# Patient Record
Sex: MALE | Race: ASIAN | Employment: FULL TIME | URBAN - METROPOLITAN AREA
[De-identification: names, ages, dates, MRNs, and addresses within clinical notes are randomized per-mention and may not be internally consistent; named-entity substitution may affect disease eponyms.]

---

## 2017-02-14 ENCOUNTER — OFFICE VISIT (OUTPATIENT)
Dept: HEMATOLOGY | Age: 47
End: 2017-02-14

## 2017-02-14 VITALS
RESPIRATION RATE: 19 BRPM | DIASTOLIC BLOOD PRESSURE: 78 MMHG | HEIGHT: 67 IN | SYSTOLIC BLOOD PRESSURE: 103 MMHG | HEART RATE: 74 BPM | TEMPERATURE: 98.5 F | WEIGHT: 160.8 LBS | BODY MASS INDEX: 25.24 KG/M2 | OXYGEN SATURATION: 99 %

## 2017-02-14 DIAGNOSIS — B18.1 CHRONIC HEPATITIS B (HCC): Primary | ICD-10-CM

## 2017-02-14 NOTE — MR AVS SNAPSHOT
Visit Information Date & Time Provider Department Dept. Phone Encounter #  
 2/14/2017  8:30 AM Jennifer Freedman NP [facility]  [facility] [id number] Follow-up Instructions Return in about 6 months (around 8/14/2017). Your Appointments 8/14/2017  8:30 AM  
Follow Up with Jennifer Freed[facility] ([address] Appt Note: Follow Up  
 [address] [place] [address] At [facility] [phone] [facility] [address]  
59 [facility] [address] Upcoming Health Maintenance Date Due Pneumococcal 19-64 Medium Risk (1 of 1 - PPSV23) 5/3/1990 DTaP/Tdap/Td series (1 - Tdap) 5/3/1992 INFLUENZA AGE 9 TO ADULT 8/1/2016 Allergies as of 2/14/2017  Review Complete On: 8/15/2016 By: Jennifer Freedman NP Severity Noted Reaction Type Reactions Penicillins  10/10/2011    Unknown (comments) Pt had a reaction as a child and is unsure if he is still allergic. Current Immunizations  Never Reviewed No immunizations on file. Not reviewed this visit You Were Diagnosed With   
  
 Codes Comments Chronic hepatitis B ([facility]    -  Primary ICD-10-CM: B18.1 ICD-9-CM: 070.32 Vitals BP Pulse Temp Resp Height(growth percentile) 103/78 (BP 1 Location: Left arm, BP Patient Position: Sitting) 74 98.5 °F (36.9 °C) (Tympanic) 19 5' 7\" (1.702 m) Weight(growth percentile) SpO2 BMI Smoking Status 160 lb 12.8 oz (72.9 kg) 99% 25.18 kg/m2 Never Smoker BMI and BSA Data Body Mass Index Body Surface Area  
 25.18 kg/m 2 1.86 m 2 Preferred Pharmacy Pharmacy Name Phone [id number] [place], [address] [address] [phone] Your Updated Medication List  
  
Notice  As of 2/14/2017  9:05 AM  
 You have not been prescribed any medications. We Performed the Following HEPATIC FUNCTION PANEL [95837 CPT(R)] HEPATITIS B, QT, PCR [88230 CPT(R)] Follow-up Instructions Return in about 6 months (around 8/14/2017). To-Do List   
 07/14/2017 Imaging:  [facility] & HEALTH SERVICES! Dear Don Stanford: Thank you for requesting a Hyper9 account. Our records indicate that you already have an active Hyper9 account. You can access your account anytime at [email or web address]. [email or web address] Did you know that you can access your hospital and ER discharge instructions at any time in Hyper9? You can also review all of your test results from your hospital stay or ER visit. Additional Information If you have questions, please visit the Frequently Asked Questions section of the Hyper9 website at [email or web address] Remember, Hyper9 is NOT to be used for urgent needs. For medical emergencies, dial 911. Now available from your iPhone and Android! Please provide this summary of care documentation to your next provider. Your primary care clinician is listed as Mary Alvarez. If you have any questions after today's visit, please call [phone].

## 2017-02-14 NOTE — PROGRESS NOTES
93 Nandini Arrington MD, [address]     April Sergio Sheth, KIN Ruth MD, MD Kizzy Armendariz Rivka Levant, SOPHIA Chenaritan     [address], [address] [facility], [address]     [phone]     FAX: [address] [place] [address], [address] [facility], [address]     [phone]     FAX: [phone]     PCP: Erlin Neal MD  GI: Cassie Meier    Patient Active Problem List   Diagnosis Code    Chronic hepatitis B ([facility].) B18.1       Remy Richmond returns to the [address] regarding chronic HBV and its management. The active problem list, all pertinent past medical history, medications, radiologic findings and laboratory findings related to the liver disorder were reviewed with the patient. The patient is a 40y.o. year old , male who has tested positive for HBV as a teenager while living in [place] in the 1970s. His HBV has remained inactive and hasn't required antiviral therapy. The most recent laboratory studies indicate that his liver transaminases, alkaline phosphatase, tests of hepatic synthetic and metabolic function and the platelet count are all normal. His last liver ultrasound was normal.    The patient feels well and voices no complaints today. He continues to work full time. The patient completes all daily activities without any functional limitations. The patient has not noted yellowing of the eyes or skin, itching, dark urine, swelling of the lower extremities, hematemesis, hematochezia. Allergies   Allergen Reactions    Penicillins Unknown (comments)     Pt had a reaction as a child and is unsure if he is still allergic. No current outpatient prescriptions on file. No current facility-administered medications for this visit.       REVIEW OF SYSTEMS:  Systems related to all organ systems were reviewed. All were negative except as noted above. FAMILY/SOCIAL HISTORY:  The patient is . There is 1 child. The patient has never smoked tobacco products. The patient has never consumed significant amounts of alcohol. The patient currently works full time in banking/finance. PHYSICAL EXAMINATION:  Visit Vitals    /78 (BP 1 Location: Left arm, BP Patient Position: Sitting)    Pulse 74    Temp 98.5 °F (36.9 °C) (Tympanic)    Resp 19    Ht 5' 7\" (1.702 m)    Wt 160 lb 12.8 oz (72.9 kg)    SpO2 99%    BMI 25.18 kg/m2     General: No acute distress. Eyes: Sclera anicteric. ENT: No oral lesions, thyroid normal.  Nodes: No adenopathy. Skin: No spider angiomata, jaundice, palmar erythema. Respiratory: Lungs clear to auscultation. Cardiovascular: Regular heart rate, no murmurs, no JVD. Abdomen: Soft non-tender, liver size normal to percussion/palpation. Spleen not palpable. No obvious ascites. Extremities: No edema, no muscle wasting, no gross arthritic changes. Neurologic: Alert and oriented, cranial nerves grossly intact, no asterixsis. LABORATORY STUDIES:    Liver [place] of [address] [place] [address] & Units 2/14/2017 8/15/2016 1/21/2016   AST 0 - 40 IU/L 20 13 15   ALT 0 - 44 IU/L 27 23 16   Alk Phos 39 - 117 IU/L 37 (L) 38 (L) 37 (L)   Bili, Total 0.0 - 1.2 mg/dL 0.9 0.7 1.0   Bili, Direct 0.00 - 0.40 mg/dL 0.18 0.16    Albumin 3.5 - 5.5 g/dL 4.5 4.3 4.5   BUN 6 - 24 mg/dL   21   Creat 0.76 - 1.27 mg/dL   0.96   Na 134 - 144 mmol/L   142   K 3.5 - 5.2 mmol/L   4.0   Cl 97 - 108 mmol/L   102   CO2 18 - 29 mmol/L   25   Glucose 65 - 99 mg/dL   100 (H)   Virology Latest Ref Rng & Units 2/14/2017 8/15/2016 1/21/2016   HBV DNA IU/mL 1040 390 930     SEROLOGIES:  9/2011: HBsurface antigen positive, HBE antgen negative, anti-HBE positive.   10/2011: HAV total negative, HBsAntigen positive, anti-HBcore positive, anti-HBsurface negative, HBEantigen negative, anti-HBE positive, HBV DNA Log 4.6  Serologies Latest Ref Rng 10/10/2011   Hep A Ab, Total Negative Negative   Hep B Surface Ag Negative Confirm. indicated   Hep B Core Ab, Total Negative Positive (A)   Hep B Surface Ab 0.00 - 0.99 Index Value <0.1     LIVER HISTOLOGY:  Not performed    ENDOSCOPIC PROCEDURES:  Not performed    RADIOLOGY:  9/2011: Ultrasound of the liver. Echogenic consistent with fatty liver or chronic liver disease. No liver mass lesions. No ascites. 11/2012: Abdominal ultrasound-nonvisualization of the pancreas otherwise unremarkable. 12/2013. Abdominal ultrasound-normal abdominal exam.  1/2016. Ultrasound of liver. Normal liver. No liver mass lesions. No dilated bile ducts. No ascites. 7/2016. Ultrasound of liver. Echogenic consistent with chronic liver disease. No liver mass lesions. No dilated bile ducts. No ascites. OTHER TESTING:  Not available     ASSESSMENT AND PLAN:  Chronic HBV with normal liver transaminases and liver function. He remains inactive with a normal ALT and low levels of HBV DNA. He will continue follow up every 6 months to assess for activation. [facility] 75. surveillance. Currently up to date with an unremarkable ultrasound in July 2016. Next imaging will be in July 2017. This was ordered today. The patient was directed to continue all current medications at the current dosages. There are no contraindications for the patient to take any medications that are necessary for treatment of other medical issues. [facility] 87 in 6 months.     Rodrigo Moore NP  Liver [place] [address]  Ph: [phone]  Fax: [phone]  Email: [email or web address]

## 2017-02-15 LAB
ALBUMIN SERPL-MCNC: 4.5 G/DL (ref 3.5–5.5)
ALP SERPL-CCNC: 37 IU/L (ref 39–117)
ALT SERPL-CCNC: 27 IU/L (ref 0–44)
AST SERPL-CCNC: 20 IU/L (ref 0–40)
BILIRUB DIRECT SERPL-MCNC: 0.18 MG/DL (ref 0–0.4)
BILIRUB SERPL-MCNC: 0.9 MG/DL (ref 0–1.2)
HBV DNA SERPL NAA+PROBE-ACNC: 1040 IU/ML
HBV DNA SERPL NAA+PROBE-LOG IU: 3.02 LOG10IU/ML
PROT SERPL-MCNC: 6.9 G/DL (ref 6–8.5)
REF LAB TEST REF RANGE: NORMAL

## 2017-09-19 ENCOUNTER — OFFICE VISIT (OUTPATIENT)
Dept: HEMATOLOGY | Age: 47
End: 2017-09-19

## 2017-09-19 VITALS
HEART RATE: 78 BPM | SYSTOLIC BLOOD PRESSURE: 114 MMHG | DIASTOLIC BLOOD PRESSURE: 65 MMHG | BODY MASS INDEX: 24.28 KG/M2 | TEMPERATURE: 98.2 F | WEIGHT: 155 LBS | OXYGEN SATURATION: 99 %

## 2017-09-19 DIAGNOSIS — B18.1 CHRONIC HEPATITIS B (HCC): Primary | ICD-10-CM

## 2017-09-19 NOTE — PROGRESS NOTES
134 E Rebound MD Sebas, [address], [place], [place]       Climmie Esther, KIN Jeter, [facility]   SOPHIA Saha NP        at [address], [address] [address]     [phone]     FAX: [phone]    at [facility], [address] [place] [address], [address]     [phone]     FAX: [phone]     PCP: Yuriy Melo MD  GI: Jody Chappell    Patient Active Problem List   Diagnosis Code    Chronic hepatitis B ([facility] B18.1       Sis Vyas returns to the [address] regarding chronic HBV and its management. The active problem list, all pertinent past medical history, medications, radiologic findings and laboratory findings related to the liver disorder were reviewed with the patient. The patient is a 56y.o. year old , male who has tested positive for HBV as a teenager while living in [place] in the 1970s. His HBV has remained inactive and hasn't required antiviral therapy. The most recent laboratory studies indicate that his liver transaminases, alkaline phosphatase, tests of hepatic synthetic and metabolic function and the platelet count are all normal. His last liver ultrasound was normal.    The patient feels well and voices no complaints today. He continues to work full time. The patient completes all daily activities without any functional limitations. The patient has not noted yellowing of the eyes or skin, itching, dark urine, swelling of the lower extremities, hematemesis, hematochezia. Allergies   Allergen Reactions    Penicillins Unknown (comments)     Pt had a reaction as a child and is unsure if he is still allergic. No current outpatient prescriptions on file. No current facility-administered medications for this visit.       REVIEW OF SYSTEMS:  Systems related to all organ systems were reviewed. All were negative except as noted above. FAMILY/SOCIAL HISTORY:  The patient is . There is 1 child. The patient has never smoked tobacco products. The patient has never consumed significant amounts of alcohol. The patient currently works full time in banking/finance. PHYSICAL EXAMINATION:  Visit Vitals    /65    Pulse 78    Temp 98.2 °F (36.8 °C) (Tympanic)    Wt 155 lb (70.3 kg)    SpO2 99%    BMI 24.28 kg/m2     General: No acute distress. Eyes: Sclera anicteric. ENT: No oral lesions, thyroid normal.  Nodes: No adenopathy. Skin: No spider angiomata, jaundice, palmar erythema. Respiratory: Lungs clear to auscultation. Cardiovascular: Regular heart rate, no murmurs, no JVD. Abdomen: Soft non-tender, liver size normal to percussion/palpation. Spleen not palpable. No obvious ascites. Extremities: No edema, no muscle wasting, no gross arthritic changes. Neurologic: Alert and oriented, cranial nerves grossly intact, no asterixsis. LABORATORY STUDIES:    [address] & Units 9/19/2017 2/14/2017 8/15/2016   WBC 3.4 - 10.8 x10E3/uL 5.8     ANC 1.4 - 7.0 x10E3/uL      HGB 12.6 - 17.7 g/dL 14.6      - 379 x10E3/uL 184     AST 0 - 40 IU/L 19 20 13   ALT 0 - 44 IU/L 21 27 23   Alk Phos 39 - 117 IU/L 37 (L) 37 (L) 38 (L)   Bili, Total 0.0 - 1.2 mg/dL 0.8 0.9 0.7   Bili, Direct 0.00 - 0.40 mg/dL  0.18 0.16   Albumin 3.5 - 5.5 g/dL 4.4 4.5 4.3   BUN 6 - 24 mg/dL 16     Creat 0.76 - 1.27 mg/dL 0.87     Na 134 - 144 mmol/L 142     K 3.5 - 5.2 mmol/L 4.0     Cl 96 - 106 mmol/L 100     CO2 18 - 29 mmol/L 28     Glucose 65 - 99 mg/dL 87     Virology Latest Ref Rng & Units  2/14/2017 8/15/2016   HBV DNA IU/mL  1040 390     SEROLOGIES:  9/2011: HBsurface antigen positive, HBE antgen negative, anti-HBE positive.   10/2011: HAV total negative, HBsAntigen positive, anti-HBcore positive, anti-HBsurface negative, HBEantigen negative, anti-HBE positive, HBV DNA Log 4.6  Serologies Latest Ref Rng 10/10/2011   Hep A Ab, Total Negative Negative   Hep B Surface Ag Negative Confirm. indicated   Hep B Core Ab, Total Negative Positive (A)   Hep B Surface Ab 0.00 - 0.99 Index Value <0.1     LIVER HISTOLOGY:  Not performed    ENDOSCOPIC PROCEDURES:  Not performed    RADIOLOGY:  9/2011: Ultrasound of the liver. Echogenic consistent with fatty liver or chronic liver disease. No liver mass lesions. No ascites. 11/2012: Abdominal ultrasound-nonvisualization of the pancreas otherwise unremarkable. 12/2013. Abdominal ultrasound-normal abdominal exam.  1/2016. Ultrasound of liver. Normal liver. No liver mass lesions. No dilated bile ducts. No ascites. 7/2016. Ultrasound of liver. Echogenic consistent with chronic liver disease. No liver mass lesions. No dilated bile ducts. No ascites. OTHER TESTING:  Not available     ASSESSMENT AND PLAN:  Chronic HBV with normal liver transaminases and liver function. He remains inactive with a normal ALT and low levels of HBV DNA. He will continue follow up every 6 months to assess for activation. [facility] 75. surveillance. Unremarkable ultrasound in July 2016. Next imaging should have been in July 2017. This was ordered again today. Discussed the importance of getting this done in the near future. The patient was directed to continue all current medications at the current dosages. There are no contraindications for the patient to take any medications that are necessary for treatment of other medical issues. [address] [facility] 87 in 6 months.     Rand Ayon NP  Liver [place] [address]  Ph: [phone]  Fax: [phone]  Email: [email or web address]

## 2017-09-19 NOTE — MR AVS SNAPSHOT
Visit Information Date & Time Provider Department Dept. Phone Encounter #  
 9/19/2017  8:00 AM April G Fay Dubose NP [facility] [id number] Upcoming Health Maintenance Date Due Pneumococcal 19-64 Medium Risk (1 of 1 - PPSV23) 5/3/1990 DTaP/Tdap/Td series (1 - Tdap) 5/3/1992 INFLUENZA AGE 9 TO ADULT 8/1/2017 Allergies as of 9/19/2017  Review Complete On: 9/19/2017 By: Sheba Humphries Severity Noted Reaction Type Reactions Penicillins  10/10/2011    Unknown (comments) Pt had a reaction as a child and is unsure if he is still allergic. Current Immunizations  Never Reviewed No immunizations on file. Not reviewed this visit You Were Diagnosed With   
  
 Codes Comments Chronic hepatitis B ([facility]    -  Primary ICD-10-CM: B18.1 ICD-9-CM: 070.32 Vitals BP Pulse Temp Weight(growth percentile) SpO2 BMI  
 114/65 78 98.2 °F (36.8 °C) (Tympanic) 155 lb (70.3 kg) 99% 24.28 kg/m2 Smoking Status Never Smoker BMI and BSA Data Body Mass Index Body Surface Area  
 24.28 kg/m 2 1.82 m 2 Preferred Pharmacy Pharmacy Name Phone [id number] [address] [address] [phone] Your Updated Medication List  
  
Notice  As of 9/19/2017  8:34 AM  
 You have not been prescribed any medications. We Performed the Following CBC W/O DIFF [46220 CPT(R)] HEPATITIS B, QT, PCR [71149 CPT(R)] METABOLIC PANEL, COMPREHENSIVE [82625 CPT(R)] Introducing [facility] & HEALTH SERVICES! Dear Jessi Chaudhary: Thank you for requesting a [employer] account. Our records indicate that you already have an active [employer] account. You can access your account anytime at [email or web address] Did you know that you can access your hospital and ER discharge instructions at any time in [employer].?   You can also review all of your test results from your hospital stay or ER visit. Additional Information If you have questions, please visit the Frequently Asked Questions section of the [employer] website at [email or web address] Remember, [employer] is NOT to be used for urgent needs. For medical emergencies, dial 911. Now available from your iPhone and Android! Please provide this summary of care documentation to your next provider. Your primary care clinician is listed as Franco Nuñez. If you have any questions after today's visit, please call [phone].

## 2017-09-20 LAB
ALBUMIN SERPL-MCNC: 4.4 G/DL (ref 3.5–5.5)
ALBUMIN/GLOB SERPL: 1.7 {RATIO} (ref 1.2–2.2)
ALP SERPL-CCNC: 37 IU/L (ref 39–117)
ALT SERPL-CCNC: 21 IU/L (ref 0–44)
AST SERPL-CCNC: 19 IU/L (ref 0–40)
BILIRUB SERPL-MCNC: 0.8 MG/DL (ref 0–1.2)
BUN SERPL-MCNC: 16 MG/DL (ref 6–24)
BUN/CREAT SERPL: 18 (ref 9–20)
CALCIUM SERPL-MCNC: 9.4 MG/DL (ref 8.7–10.2)
CHLORIDE SERPL-SCNC: 100 MMOL/L (ref 96–106)
CO2 SERPL-SCNC: 28 MMOL/L (ref 18–29)
CREAT SERPL-MCNC: 0.87 MG/DL (ref 0.76–1.27)
ERYTHROCYTE [DISTWIDTH] IN BLOOD BY AUTOMATED COUNT: 13.5 % (ref 12.3–15.4)
GLOBULIN SER CALC-MCNC: 2.6 G/DL (ref 1.5–4.5)
GLUCOSE SERPL-MCNC: 87 MG/DL (ref 65–99)
HBV DNA SERPL NAA+PROBE-ACNC: 50 IU/ML
HBV DNA SERPL NAA+PROBE-LOG IU: 1.7 LOG10IU/ML
HCT VFR BLD AUTO: 44.7 % (ref 37.5–51)
HGB BLD-MCNC: 14.6 G/DL (ref 12.6–17.7)
MCH RBC QN AUTO: 30.3 PG (ref 26.6–33)
MCHC RBC AUTO-ENTMCNC: 32.7 G/DL (ref 31.5–35.7)
MCV RBC AUTO: 93 FL (ref 79–97)
PLATELET # BLD AUTO: 184 X10E3/UL (ref 150–379)
POTASSIUM SERPL-SCNC: 4 MMOL/L (ref 3.5–5.2)
PROT SERPL-MCNC: 7 G/DL (ref 6–8.5)
RBC # BLD AUTO: 4.82 X10E6/UL (ref 4.14–5.8)
REF LAB TEST REF RANGE: NORMAL
SODIUM SERPL-SCNC: 142 MMOL/L (ref 134–144)
WBC # BLD AUTO: 5.8 X10E3/UL (ref 3.4–10.8)

## 2017-10-02 ENCOUNTER — HOSPITAL ENCOUNTER (OUTPATIENT)
Dept: ULTRASOUND IMAGING | Age: 47
Discharge: HOME | End: 2017-10-02
Attending: NURSE PRACTITIONER
Payer: COMMERCIAL

## 2017-10-02 DIAGNOSIS — B18.1 CHRONIC HEPATITIS B (HCC): ICD-10-CM

## 2017-10-02 PROCEDURE — 76705 ECHO EXAM OF ABDOMEN: CPT

## 2017-10-05 DIAGNOSIS — B18.1 CHRONIC HEPATITIS B (HCC): Primary | ICD-10-CM

## 2017-10-12 ENCOUNTER — HOSPITAL ENCOUNTER (OUTPATIENT)
Dept: MRI IMAGING | Age: 47
Discharge: HOME | End: 2017-10-12
Attending: NURSE PRACTITIONER
Payer: COMMERCIAL

## 2017-10-12 VITALS — WEIGHT: 155 LBS | BODY MASS INDEX: 24.28 KG/M2

## 2017-10-12 DIAGNOSIS — B18.1 CHRONIC HEPATITIS B (HCC): ICD-10-CM

## 2017-10-12 PROCEDURE — 74183 MRI ABD W/O CNTR FLWD CNTR: CPT

## 2017-10-12 PROCEDURE — A9577 INJ MULTIHANCE: HCPCS | Performed by: NURSE PRACTITIONER

## 2017-10-12 PROCEDURE — 74011250636 HC RX REV CODE- 250/636: Performed by: NURSE PRACTITIONER

## 2017-10-12 RX ADMIN — GADOBENATE DIMEGLUMINE 14 ML: 529 INJECTION, SOLUTION INTRAVENOUS at 09:31

## 2018-03-20 ENCOUNTER — OFFICE VISIT (OUTPATIENT)
Dept: HEMATOLOGY | Age: 48
End: 2018-03-20

## 2018-03-20 VITALS
BODY MASS INDEX: 25.22 KG/M2 | HEART RATE: 75 BPM | WEIGHT: 161 LBS | SYSTOLIC BLOOD PRESSURE: 97 MMHG | OXYGEN SATURATION: 99 % | TEMPERATURE: 97.6 F | DIASTOLIC BLOOD PRESSURE: 69 MMHG

## 2018-03-20 DIAGNOSIS — B18.1 CHRONIC HEPATITIS B (HCC): Primary | ICD-10-CM

## 2018-03-20 NOTE — PROGRESS NOTES
134 E Rebound MD [place], [address], [facility] [place]       SOPHIA Otto PA-C Salvador Honer, [facility]-BC   SOPHIA Miles NP        at [address]     [facility] [facility], [address] [address]     [phone]     FAX: [phone]    at [address], [address] [facility], [address]     [phone]     FAX: [phone]     PCP: Neftaly Smith MD  GI: Vielka Nayak    Patient Active Problem List   Diagnosis Code    Chronic hepatitis B ([address] B18.1       Paz Sneed returns to the [address] regarding chronic HBV and its management. The active problem list, all pertinent past medical history, medications, radiologic findings and laboratory findings related to the liver disorder were reviewed with the patient. The patient is a 56y.o. year old , male who has tested positive for HBV as a teenager while living in [place] in the 1970s. His HBV has remained inactive and hasn't required antiviral therapy. The most recent laboratory studies indicate that his liver transaminases, alkaline phosphatase, tests of hepatic synthetic and metabolic function and the platelet count are all normal. His last liver ultrasound was normal.    His last US in October 2017, revealed an ill-defined lesion. MRI was subsequently done and ruled out [address] The patient feels well and voices no complaints today. He continues to work full time. The patient completes all daily activities without any functional limitations. The patient has not noted yellowing of the eyes or skin, itching, dark urine, swelling of the lower extremities, hematemesis, hematochezia. Allergies   Allergen Reactions    Penicillins Unknown (comments)     Pt had a reaction as a child and is unsure if he is still allergic. No current outpatient prescriptions on file.      No current facility-administered medications for this visit. SYSTEM REVIEW NOT RELATED TO LIVER DISEASE OR REVIEWED ABOVE:  Constitution systems: Negative for fever, chills, weight gain, weight loss. Eyes: Negative for visual changes. ENT: Negative for sore throat, painful swallowing. Respiratory: Negative for cough, hemoptysis, SOB. Cardiology: Negative for chest pain, palpitations. GI:  Negative for constipation or diarrhea. : Negative for urinary frequency, dysuria, hematuria, nocturia. Skin: Negative for rash. Hematology: Negative for easy bruising, blood clots. Musculo-skelatal: Negative for back pain, muscle pain, weakness. Neurologic: Negative for headaches, dizziness, vertigo, memory problems not related to HE. Psychology: Negative for anxiety, depression. FAMILY/SOCIAL HISTORY:  The patient is . There is 1 child. The patient has never smoked tobacco products. The patient has never consumed significant amounts of alcohol. The patient currently works full time in banking/finance. PHYSICAL EXAMINATION:  Visit Vitals    BP 97/69 (BP 1 Location: Left arm, BP Patient Position: Sitting)    Pulse 75    Temp 97.6 °F (36.4 °C) (Tympanic)    Wt 161 lb (73 kg)    SpO2 99%    BMI 25.22 kg/m2     General: No acute distress. Eyes: Sclera anicteric. ENT: No oral lesions, thyroid normal.  Nodes: No adenopathy. Skin: No spider angiomata, jaundice, palmar erythema. Respiratory: Lungs clear to auscultation. Cardiovascular: Regular heart rate, no murmurs, no JVD. Abdomen: Soft non-tender, liver size normal to percussion/palpation. Spleen not palpable. No obvious ascites. Extremities: No edema, no muscle wasting, no gross arthritic changes. Neurologic: Alert and oriented, cranial nerves grossly intact, no asterixsis.     LABORATORY STUDIES:    Liver [place] of [address] & Units 3/20/2018 9/19/2017   WBC 3.4 - 10.8 x10E3/uL  5.8   ANC 1.4 - 7.0 x10E3/uL HGB 12.6 - 17.7 g/dL  14.6    - 379 x10E3/uL  184   AST 0 - 40 IU/L 26 19   ALT 0 - 44 IU/L 35 21   Alk Phos 39 - 117 IU/L 37 (L) 37 (L)   Bili, Total 0.0 - 1.2 mg/dL 1.1 0.8   Bili, Direct 0.00 - 0.40 mg/dL     Albumin 3.5 - 5.5 g/dL 4.4 4.4   BUN 6 - 24 mg/dL 19 16   Creat 0.76 - 1.27 mg/dL 0.89 0.87   Na 134 - 144 mmol/L 141 142   K 3.5 - 5.2 mmol/L 3.9 4.0   Cl 96 - 106 mmol/L 101 100   CO2 18 - 29 mmol/L 30 (H) 28   Glucose 65 - 99 mg/dL 103 (H) 87   Virology Latest Ref Rng & Units 3/20/2018 9/19/2017   HBV DNA IU/mL 50 50     SEROLOGIES:  9/2011: HBsurface antigen positive, HBE antgen negative, anti-HBE positive. 10/2011: HAV total negative, HBsAntigen positive, anti-HBcore positive, anti-HBsurface negative, HBEantigen negative, anti-HBE positive, HBV DNA Log 4.6  Serologies Latest Ref Rng 10/10/2011   Hep A Ab, Total Negative Negative   Hep B Surface Ag Negative Confirm. indicated   Hep B Core Ab, Total Negative Positive (A)   Hep B Surface Ab 0.00 - 0.99 Index Value <0.1     LIVER HISTOLOGY:  Not performed    ENDOSCOPIC PROCEDURES:  Not performed    RADIOLOGY:  9/2011: Ultrasound of the liver. Echogenic consistent with fatty liver or chronic liver disease. No liver mass lesions. No ascites. 11/2012: Abdominal ultrasound-nonvisualization of the pancreas otherwise unremarkable. 12/2013. Abdominal ultrasound-normal abdominal exam.  1/2016. Ultrasound of liver. Normal liver. No liver mass lesions. No dilated bile ducts. No ascites. 7/2016. Ultrasound of liver. Echogenic consistent with chronic liver disease. No liver mass lesions. No dilated bile ducts. No ascites. 10/2017. Ultrasound of liver. Ill-defined 1.7 cm hypoechoic abnormality right hepatic lobe. No acute abnormality. 10/2017. MRI of liver. No concerning hepatic lesions. No overt cirrhosis. OTHER TESTING:  Not available     ASSESSMENT AND PLAN:  Chronic HBV with normal liver transaminases and liver function.  He remains inactive with a normal ALT and low levels of HBV DNA. He will continue follow up every 6 months to assess for activation. [facility] 75. surveillance. Currently up to date with an unremarkable MRI in October 2017. Next imaging will be in October 2018. This was ordered today. The patient was directed to continue all current medications at the current dosages. There are no contraindications for the patient to take any medications that are necessary for treatment of other medical issues. [facility] 87 in 6 months.     Marisela Barrett NP  Liver [place] [address]  Ph: [phone]  Fax: [phone]  Email: [email or web address]

## 2018-03-20 NOTE — LETTER
3/20/2018 9:46 AM 
 
Mr. Amanda Mcclellan [id number] Haydee Krueger Raj De Bojorquez [address] [facility], [address] 
[phone] (O) [unfilled] This letter is to confirm that I have ordered the following test for you: 
 
Ultrasound of the abdomen Please contact [address] [place] [address] team as soon as possible to have this appointment scheduled. They can be reached at [phone]. Should you have any problems with contacting them, please let us know by calling [phone]. Sincerely, Sincerely, Jim Delgado NP

## 2018-03-20 NOTE — PROGRESS NOTES
1. Have you been to the ER, urgent care clinic since your last visit? Hospitalized since your last visit? No    2. Have you seen or consulted any other health care providers outside of the [address] [place] [address] since your last visit? Include any pap smears or colon screening.  No   Chief Complaint   Patient presents with    Follow-up     Visit Vitals    BP 97/69 (BP 1 Location: Left arm, BP Patient Position: Sitting)    Pulse 75    Temp 97.6 °F (36.4 °C) (Tympanic)    Wt 161 lb (73 kg)    SpO2 99%    BMI 25.22 kg/m2     PHQ over the last two weeks 3/20/2018   Little interest or pleasure in doing things Not at all   Feeling down, depressed or hopeless Not at all   Total Score PHQ 2 0     Learning Assessment 3/20/2018   PRIMARY LEARNER Patient   BARRIERS PRIMARY LEARNER NONE   CO-LEARNER CAREGIVER No   PRIMARY LANGUAGE ENGLISH   LEARNER PREFERENCE PRIMARY LISTENING   ANSWERED BY patient   RELATIONSHIP SELF

## 2018-03-21 LAB
ALBUMIN SERPL-MCNC: 4.4 G/DL (ref 3.5–5.5)
ALBUMIN/GLOB SERPL: 1.8 {RATIO} (ref 1.2–2.2)
ALP SERPL-CCNC: 37 IU/L (ref 39–117)
ALT SERPL-CCNC: 35 IU/L (ref 0–44)
AST SERPL-CCNC: 26 IU/L (ref 0–40)
BILIRUB SERPL-MCNC: 1.1 MG/DL (ref 0–1.2)
BUN SERPL-MCNC: 19 MG/DL (ref 6–24)
BUN/CREAT SERPL: 21 (ref 9–20)
CALCIUM SERPL-MCNC: 9.4 MG/DL (ref 8.7–10.2)
CHLORIDE SERPL-SCNC: 101 MMOL/L (ref 96–106)
CO2 SERPL-SCNC: 30 MMOL/L (ref 18–29)
CREAT SERPL-MCNC: 0.89 MG/DL (ref 0.76–1.27)
GFR SERPLBLD CREATININE-BSD FMLA CKD-EPI: 103 ML/MIN/1.73
GFR SERPLBLD CREATININE-BSD FMLA CKD-EPI: 119 ML/MIN/1.73
GLOBULIN SER CALC-MCNC: 2.4 G/DL (ref 1.5–4.5)
GLUCOSE SERPL-MCNC: 103 MG/DL (ref 65–99)
HBV DNA SERPL NAA+PROBE-ACNC: 50 IU/ML
HBV DNA SERPL NAA+PROBE-LOG IU: 1.7 LOG10IU/ML
POTASSIUM SERPL-SCNC: 3.9 MMOL/L (ref 3.5–5.2)
PROT SERPL-MCNC: 6.8 G/DL (ref 6–8.5)
REF LAB TEST REF RANGE: NORMAL
SODIUM SERPL-SCNC: 141 MMOL/L (ref 134–144)

## 2018-03-24 LAB
AFP L3 MFR SERPL: NORMAL % (ref 0–9.9)
AFP SERPL-MCNC: 1.6 NG/ML (ref 0–8)

## 2018-03-30 ENCOUNTER — TELEPHONE (OUTPATIENT)
Dept: HEMATOLOGY | Age: 48
End: 2018-03-30

## 2018-03-30 NOTE — TELEPHONE ENCOUNTER
Spoke with the patient and he stated that he is going to wait until August to schedule his appointment to have his ultrasound done.